# Patient Record
Sex: FEMALE | Race: OTHER | HISPANIC OR LATINO
[De-identification: names, ages, dates, MRNs, and addresses within clinical notes are randomized per-mention and may not be internally consistent; named-entity substitution may affect disease eponyms.]

---

## 2022-02-16 ENCOUNTER — APPOINTMENT (OUTPATIENT)
Dept: ANTEPARTUM | Facility: CLINIC | Age: 27
End: 2022-02-16
Payer: MEDICAID

## 2022-02-16 ENCOUNTER — ASOB RESULT (OUTPATIENT)
Age: 27
End: 2022-02-16

## 2022-02-16 PROBLEM — Z00.00 ENCOUNTER FOR PREVENTIVE HEALTH EXAMINATION: Status: ACTIVE | Noted: 2022-02-16

## 2022-02-16 PROCEDURE — 76811 OB US DETAILED SNGL FETUS: CPT

## 2022-02-24 ENCOUNTER — APPOINTMENT (OUTPATIENT)
Dept: MATERNAL FETAL MEDICINE | Facility: CLINIC | Age: 27
End: 2022-02-24
Payer: MEDICAID

## 2022-02-24 ENCOUNTER — ASOB RESULT (OUTPATIENT)
Age: 27
End: 2022-02-24

## 2022-02-24 PROCEDURE — 99442: CPT

## 2022-03-02 ENCOUNTER — APPOINTMENT (OUTPATIENT)
Dept: ANTEPARTUM | Facility: CLINIC | Age: 27
End: 2022-03-02

## 2022-03-02 ENCOUNTER — APPOINTMENT (OUTPATIENT)
Dept: MATERNAL FETAL MEDICINE | Facility: CLINIC | Age: 27
End: 2022-03-02

## 2022-03-04 ENCOUNTER — APPOINTMENT (OUTPATIENT)
Dept: ANTEPARTUM | Facility: CLINIC | Age: 27
End: 2022-03-04

## 2022-03-04 ENCOUNTER — APPOINTMENT (OUTPATIENT)
Dept: MATERNAL FETAL MEDICINE | Facility: CLINIC | Age: 27
End: 2022-03-04
Payer: MEDICAID

## 2022-03-04 VITALS
BODY MASS INDEX: 30.84 KG/M2 | WEIGHT: 153 LBS | RESPIRATION RATE: 16 BRPM | OXYGEN SATURATION: 99 % | SYSTOLIC BLOOD PRESSURE: 110 MMHG | HEART RATE: 69 BPM | DIASTOLIC BLOOD PRESSURE: 60 MMHG | HEIGHT: 59 IN

## 2022-03-04 DIAGNOSIS — Z3A.22 22 WEEKS GESTATION OF PREGNANCY: ICD-10-CM

## 2022-03-04 DIAGNOSIS — Z84.89 FAMILY HISTORY OF OTHER SPECIFIED CONDITIONS: ICD-10-CM

## 2022-03-04 DIAGNOSIS — O09.892 SUPERVISION OF OTHER HIGH RISK PREGNANCIES, SECOND TRIMESTER: ICD-10-CM

## 2022-03-04 DIAGNOSIS — Z64.1 PROBLEMS RELATED TO MULTIPARITY: ICD-10-CM

## 2022-03-04 DIAGNOSIS — O99.212 OBESITY COMPLICATING PREGNANCY, SECOND TRIMESTER: ICD-10-CM

## 2022-03-04 DIAGNOSIS — Z71.85 ENCOUNTER FOR IMMUNIZATION SAFETY COUNSELING: ICD-10-CM

## 2022-03-04 DIAGNOSIS — O34.219 MATERNAL CARE FOR UNSPECIFIED TYPE SCAR FROM PREVIOUS CESAREAN DELIVERY: ICD-10-CM

## 2022-03-04 PROCEDURE — 99215 OFFICE O/P EST HI 40 MIN: CPT

## 2022-03-04 RX ORDER — VITAMIN C, CALCIUM, IRON, VITAMIN D3, VITAMIN E, VITAMIN B1, VITAMIN B2, VITAMIN B3, VITAMIN B6, FOLIC ACID, IODINE, ZINC, COPPER, DOCUSATE SODIUM, DOCOSAHEXAENOIC ACID (DHA) 27-1-50 MG
KIT ORAL
Refills: 0 | Status: ACTIVE | COMMUNITY

## 2022-03-04 RX ORDER — ASPIRIN 81 MG
81 TABLET, DELAYED RELEASE (ENTERIC COATED) ORAL
Refills: 0 | Status: ACTIVE | COMMUNITY

## 2022-03-04 NOTE — VITALS
[LMP (date): ___] : LMP was on [unfilled] [GA =___ Weeks] : which calculates to a GA of [unfilled] weeks [GA= ___ Days] : and [unfilled] day(s) [JONH by LMP (date): ___] : The calculated JONH by LMP is [unfilled] [By LMP] : this is the final JONH

## 2022-03-04 NOTE — DISCUSSION/SUMMARY
[FreeTextEntry1] : She is 22 weeks gestation by her last menstrual period dates.   bpm.\par \par She is overweight and obesity has been associated with a number of maternal complications such as gestational diabetes, pre-eclampsia, thrombophlebitis, labor abnormalities, post-term pregnancies,  delivery, and operative complications. Obesity has been associated with adverse fetal outcomes such as late stillbirth and  deliveries.  Obese women also have a two to three-fold increased incidence in congenital anomalies. There were no major fetal malformations seen during the fetal anatomy ultrasound examination done on 22 \par \par She told me that her first pregnancy resulted in a 34 week  birth by  section due to extensive venereal warts.  She states that the obstetricians told her that it was a medically indicated  delivery because of the severity of the venereal warts.  Her second pregnancy was not complicated by venereal warts and resulted in a full term delivery. \par \par She had 2  sections and she was advised to have a repeat  section delivery.  I told her that multiple repeat  deliveries can result in serious maternal morbidity.  I told her that she is at risk for dense adhesions, more difficult surgery, placenta accreta, bowel injury, cystotomy, ureteral injury, ileus, hysterectomy, blood transfusions, intensive care unit admission, and  delivery.\par \par She is multiparous and we discussed the various methods available for contraception after delivery. She told me that she wants to have an operative sterilization procedure after the repeat  delivery. \par \par I told her that I recommend a glucola challenge test to screen for gestational diabetes between 24 and 28 weeks gestation.  I recommend having the COVID 19 vaccine during pregnancy if not already vaccinated. She told me that she received the fist dose of the Pfizer vaccine during 2021. She did not get the second dose. I encouraged her to get the second dose as soon as possible. I discussed the benefits and potential side effects associated with the COVID-19 vaccine.  She told me that she plans to get the second COVID-19 vaccine dose.  I told her that I recommend the Tdap vaccine between 27 and 36 weeks of gestation to prevent pertussis infection in the  infant. I recommended the flu vaccine during flu season (October through May). \par \par

## 2022-03-04 NOTE — CHIEF COMPLAINT
[G ___] : G [unfilled] [P ___] : P [unfilled] [de-identified] : history of a medically indicated  delivery in [facility]

## 2022-03-04 NOTE — OB HISTORY
[Pregnancy History] : patient received anesthesia [LMP: ___] : LMP: [unfilled] [JONH: ___] : JONH: [unfilled] [EGA: ___ wks] : EGA: [unfilled] wks [Spontaneous] : Spontaneous conception [Sonogram] : sonogram [at ___ wks] : at [unfilled] weeks [Definite:  ___ (Date)] : the last menstrual period was [unfilled] [Normal Amount/Duration] : was of a normal amount and duration [Regular Cycle Intervals] : periods have been regular [___] : Living: [unfilled] [FreeTextEntry1] : First prenatal visit was January 22, 2022.\par \par She had genetic counseling February 24, 2022. \par \par \par  [Spotting Between  Menses] : no spotting between menses

## 2022-03-04 NOTE — FAMILY HISTORY
[Age 35+ During Pregnancy] : not 35 or over during pregnancy [Reported Family History Of Birth Defects] : no congenital heart defects [Maninder-Sachs Carrier] : no Maninder-Sachs [Family History] : no mental retardation/autism [Reported Family History Of Genetic Disease] : no history of child defect in child of baby father

## 2022-04-20 ENCOUNTER — APPOINTMENT (OUTPATIENT)
Dept: ANTEPARTUM | Facility: CLINIC | Age: 27
End: 2022-04-20

## 2022-05-04 ENCOUNTER — APPOINTMENT (OUTPATIENT)
Dept: ANTEPARTUM | Facility: CLINIC | Age: 27
End: 2022-05-04
Payer: MEDICAID

## 2022-05-04 ENCOUNTER — ASOB RESULT (OUTPATIENT)
Age: 27
End: 2022-05-04

## 2022-05-04 PROCEDURE — 76816 OB US FOLLOW-UP PER FETUS: CPT

## 2022-06-13 ENCOUNTER — APPOINTMENT (OUTPATIENT)
Dept: ANTEPARTUM | Facility: CLINIC | Age: 27
End: 2022-06-13

## 2022-06-13 ENCOUNTER — ASOB RESULT (OUTPATIENT)
Age: 27
End: 2022-06-13

## 2022-06-13 PROCEDURE — 76819 FETAL BIOPHYS PROFIL W/O NST: CPT

## 2022-06-13 PROCEDURE — 76820 UMBILICAL ARTERY ECHO: CPT

## 2022-06-13 PROCEDURE — 76816 OB US FOLLOW-UP PER FETUS: CPT

## 2022-06-16 ENCOUNTER — TRANSCRIPTION ENCOUNTER (OUTPATIENT)
Age: 27
End: 2022-06-16

## 2022-06-17 ENCOUNTER — INPATIENT (INPATIENT)
Facility: HOSPITAL | Age: 27
LOS: 1 days | Discharge: HOME | End: 2022-06-19
Attending: OBSTETRICS & GYNECOLOGY | Admitting: OBSTETRICS & GYNECOLOGY
Payer: MEDICAID

## 2022-06-17 ENCOUNTER — TRANSCRIPTION ENCOUNTER (OUTPATIENT)
Age: 27
End: 2022-06-17

## 2022-06-17 ENCOUNTER — RESULT REVIEW (OUTPATIENT)
Age: 27
End: 2022-06-17

## 2022-06-17 VITALS
RESPIRATION RATE: 19 BRPM | SYSTOLIC BLOOD PRESSURE: 111 MMHG | DIASTOLIC BLOOD PRESSURE: 65 MMHG | HEART RATE: 68 BPM | TEMPERATURE: 99 F

## 2022-06-17 DIAGNOSIS — O26.893 OTHER SPECIFIED PREGNANCY RELATED CONDITIONS, THIRD TRIMESTER: ICD-10-CM

## 2022-06-17 DIAGNOSIS — O47.1 FALSE LABOR AT OR AFTER 37 COMPLETED WEEKS OF GESTATION: ICD-10-CM

## 2022-06-17 LAB
ABO RH CONFIRMATION: SIGNIFICANT CHANGE UP
BASOPHILS # BLD AUTO: 0.03 K/UL — SIGNIFICANT CHANGE UP (ref 0–0.2)
BASOPHILS NFR BLD AUTO: 0.3 % — SIGNIFICANT CHANGE UP (ref 0–2)
BLD GP AB SCN SERPL QL: SIGNIFICANT CHANGE UP
EOSINOPHIL # BLD AUTO: 0.06 K/UL — SIGNIFICANT CHANGE UP (ref 0–0.5)
EOSINOPHIL NFR BLD AUTO: 0.6 % — SIGNIFICANT CHANGE UP (ref 0–6)
HCT VFR BLD CALC: 34.1 % — LOW (ref 34.5–45)
HGB BLD-MCNC: 11.2 G/DL — LOW (ref 11.5–15.5)
HIV 1 & 2 AB SERPL IA.RAPID: SIGNIFICANT CHANGE UP
IMM GRANULOCYTES NFR BLD AUTO: 0.6 % — SIGNIFICANT CHANGE UP (ref 0–1.5)
LYMPHOCYTES # BLD AUTO: 2.8 K/UL — SIGNIFICANT CHANGE UP (ref 1–3.3)
LYMPHOCYTES # BLD AUTO: 26.7 % — SIGNIFICANT CHANGE UP (ref 13–44)
MCHC RBC-ENTMCNC: 28.4 PG — SIGNIFICANT CHANGE UP (ref 27–34)
MCHC RBC-ENTMCNC: 32.8 GM/DL — SIGNIFICANT CHANGE UP (ref 32–36)
MCV RBC AUTO: 86.3 FL — SIGNIFICANT CHANGE UP (ref 80–100)
MONOCYTES # BLD AUTO: 0.63 K/UL — SIGNIFICANT CHANGE UP (ref 0–0.9)
MONOCYTES NFR BLD AUTO: 6 % — SIGNIFICANT CHANGE UP (ref 2–14)
NEUTROPHILS # BLD AUTO: 6.9 K/UL — SIGNIFICANT CHANGE UP (ref 1.8–7.4)
NEUTROPHILS NFR BLD AUTO: 65.8 % — SIGNIFICANT CHANGE UP (ref 43–77)
PLATELET # BLD AUTO: 329 K/UL — SIGNIFICANT CHANGE UP (ref 150–400)
RAPID RVP RESULT: DETECTED
RBC # BLD: 3.95 M/UL — SIGNIFICANT CHANGE UP (ref 3.8–5.2)
RBC # FLD: 13.3 % — SIGNIFICANT CHANGE UP (ref 10.3–14.5)
RV+EV RNA SPEC QL NAA+PROBE: DETECTED
SARS-COV-2 RNA SPEC QL NAA+PROBE: SIGNIFICANT CHANGE UP
WBC # BLD: 10.48 K/UL — SIGNIFICANT CHANGE UP (ref 3.8–10.5)
WBC # FLD AUTO: 10.48 K/UL — SIGNIFICANT CHANGE UP (ref 3.8–10.5)

## 2022-06-17 PROCEDURE — 88307 TISSUE EXAM BY PATHOLOGIST: CPT | Mod: 26

## 2022-06-17 PROCEDURE — 88302 TISSUE EXAM BY PATHOLOGIST: CPT | Mod: 26

## 2022-06-17 RX ORDER — CITRIC ACID/SODIUM CITRATE 300-500 MG
30 SOLUTION, ORAL ORAL ONCE
Refills: 0 | Status: COMPLETED | OUTPATIENT
Start: 2022-06-17 | End: 2022-06-17

## 2022-06-17 RX ORDER — SODIUM CHLORIDE 9 MG/ML
1000 INJECTION, SOLUTION INTRAVENOUS
Refills: 0 | Status: DISCONTINUED | OUTPATIENT
Start: 2022-06-17 | End: 2022-06-17

## 2022-06-17 RX ORDER — OXYTOCIN 10 UNIT/ML
333.33 VIAL (ML) INJECTION
Qty: 20 | Refills: 0 | Status: DISCONTINUED | OUTPATIENT
Start: 2022-06-17 | End: 2022-06-17

## 2022-06-17 RX ORDER — SODIUM CHLORIDE 9 MG/ML
1000 INJECTION, SOLUTION INTRAVENOUS
Refills: 0 | Status: DISCONTINUED | OUTPATIENT
Start: 2022-06-17 | End: 2022-06-19

## 2022-06-17 RX ORDER — OXYCODONE HYDROCHLORIDE 5 MG/1
5 TABLET ORAL
Refills: 0 | Status: DISCONTINUED | OUTPATIENT
Start: 2022-06-17 | End: 2022-06-19

## 2022-06-17 RX ORDER — KETOROLAC TROMETHAMINE 30 MG/ML
30 SYRINGE (ML) INJECTION EVERY 6 HOURS
Refills: 0 | Status: DISCONTINUED | OUTPATIENT
Start: 2022-06-17 | End: 2022-06-18

## 2022-06-17 RX ORDER — ENOXAPARIN SODIUM 100 MG/ML
40 INJECTION SUBCUTANEOUS EVERY 24 HOURS
Refills: 0 | Status: DISCONTINUED | OUTPATIENT
Start: 2022-06-18 | End: 2022-06-19

## 2022-06-17 RX ORDER — CEFAZOLIN SODIUM 1 G
2000 VIAL (EA) INJECTION ONCE
Refills: 0 | Status: COMPLETED | OUTPATIENT
Start: 2022-06-17 | End: 2022-06-17

## 2022-06-17 RX ORDER — IBUPROFEN 200 MG
600 TABLET ORAL EVERY 6 HOURS
Refills: 0 | Status: COMPLETED | OUTPATIENT
Start: 2022-06-17 | End: 2023-05-16

## 2022-06-17 RX ORDER — DIPHENHYDRAMINE HCL 50 MG
25 CAPSULE ORAL EVERY 6 HOURS
Refills: 0 | Status: DISCONTINUED | OUTPATIENT
Start: 2022-06-17 | End: 2022-06-19

## 2022-06-17 RX ORDER — SIMETHICONE 80 MG/1
80 TABLET, CHEWABLE ORAL EVERY 4 HOURS
Refills: 0 | Status: DISCONTINUED | OUTPATIENT
Start: 2022-06-17 | End: 2022-06-19

## 2022-06-17 RX ORDER — MAGNESIUM HYDROXIDE 400 MG/1
30 TABLET, CHEWABLE ORAL
Refills: 0 | Status: DISCONTINUED | OUTPATIENT
Start: 2022-06-17 | End: 2022-06-19

## 2022-06-17 RX ORDER — TETANUS TOXOID, REDUCED DIPHTHERIA TOXOID AND ACELLULAR PERTUSSIS VACCINE, ADSORBED 5; 2.5; 8; 8; 2.5 [IU]/.5ML; [IU]/.5ML; UG/.5ML; UG/.5ML; UG/.5ML
0.5 SUSPENSION INTRAMUSCULAR ONCE
Refills: 0 | Status: DISCONTINUED | OUTPATIENT
Start: 2022-06-17 | End: 2022-06-19

## 2022-06-17 RX ORDER — OXYCODONE HYDROCHLORIDE 5 MG/1
5 TABLET ORAL ONCE
Refills: 0 | Status: DISCONTINUED | OUTPATIENT
Start: 2022-06-17 | End: 2022-06-19

## 2022-06-17 RX ORDER — OXYTOCIN 10 UNIT/ML
333.33 VIAL (ML) INJECTION
Qty: 20 | Refills: 0 | Status: DISCONTINUED | OUTPATIENT
Start: 2022-06-17 | End: 2022-06-19

## 2022-06-17 RX ORDER — LANOLIN
1 OINTMENT (GRAM) TOPICAL EVERY 6 HOURS
Refills: 0 | Status: DISCONTINUED | OUTPATIENT
Start: 2022-06-17 | End: 2022-06-19

## 2022-06-17 RX ORDER — HYDROMORPHONE HYDROCHLORIDE 2 MG/ML
0.5 INJECTION INTRAMUSCULAR; INTRAVENOUS; SUBCUTANEOUS ONCE
Refills: 0 | Status: DISCONTINUED | OUTPATIENT
Start: 2022-06-17 | End: 2022-06-17

## 2022-06-17 RX ORDER — ACETAMINOPHEN 500 MG
975 TABLET ORAL
Refills: 0 | Status: DISCONTINUED | OUTPATIENT
Start: 2022-06-17 | End: 2022-06-19

## 2022-06-17 RX ORDER — FAMOTIDINE 10 MG/ML
20 INJECTION INTRAVENOUS ONCE
Refills: 0 | Status: COMPLETED | OUTPATIENT
Start: 2022-06-17 | End: 2022-06-17

## 2022-06-17 RX ORDER — TRANEXAMIC ACID 100 MG/ML
1000 INJECTION, SOLUTION INTRAVENOUS ONCE
Refills: 0 | Status: COMPLETED | OUTPATIENT
Start: 2022-06-17 | End: 2022-06-17

## 2022-06-17 RX ORDER — SODIUM CHLORIDE 9 MG/ML
1000 INJECTION, SOLUTION INTRAVENOUS ONCE
Refills: 0 | Status: COMPLETED | OUTPATIENT
Start: 2022-06-17 | End: 2022-06-17

## 2022-06-17 RX ADMIN — Medication 30 MILLIGRAM(S): at 19:00

## 2022-06-17 RX ADMIN — Medication 975 MILLIGRAM(S): at 20:12

## 2022-06-17 RX ADMIN — SODIUM CHLORIDE 2000 MILLILITER(S): 9 INJECTION, SOLUTION INTRAVENOUS at 15:57

## 2022-06-17 RX ADMIN — Medication 100 MILLIGRAM(S): at 17:35

## 2022-06-17 RX ADMIN — FAMOTIDINE 20 MILLIGRAM(S): 10 INJECTION INTRAVENOUS at 16:40

## 2022-06-17 RX ADMIN — HYDROMORPHONE HYDROCHLORIDE 0.5 MILLIGRAM(S): 2 INJECTION INTRAMUSCULAR; INTRAVENOUS; SUBCUTANEOUS at 20:34

## 2022-06-17 RX ADMIN — TRANEXAMIC ACID 220 MILLIGRAM(S): 100 INJECTION, SOLUTION INTRAVENOUS at 17:19

## 2022-06-17 RX ADMIN — SODIUM CHLORIDE 125 MILLILITER(S): 9 INJECTION, SOLUTION INTRAVENOUS at 16:21

## 2022-06-17 RX ADMIN — Medication 30 MILLIGRAM(S): at 18:00

## 2022-06-17 RX ADMIN — Medication 30 MILLILITER(S): at 16:40

## 2022-06-17 NOTE — OB NEONATOLOGY/PEDIATRICIAN DELIVERY SUMMARY - NSPEDSNEONOTESA_OBGYN_ALL_OB_FT
Requested by DR Rogel to attend a RC/S of a 26y/o  at 37 weeks GA secondary to a NRFHRT and oligohydrmnios.......  She had + PNC, is blood type A pos, HIV neg, HBsAg neg, RPR NR, Rubella Imm, GBS Unk., COVID19 neg   L&D:  [facility] at delivery.  Baby born vertex. with good cry, transferred to warmer, orally suctioned, dried, and examined. Infant  then transferred to mother for STS.  A/P:  37 week GA female  Transition to Regular Nursery under Peds Hospitalist care. Requested by DR Rogel to attend a RC/S of a 28y/o  at 37 weeks GA secondary to a NRFHRT and oligohydrmnios.......  She had + PNC, is blood type A pos, HIV neg, HBsAg neg, RPR NR, Rubella Imm, GBS Unk., COVID19 neg   L&D:  [facility] at delivery.  Baby born vertex. with good cry, DCC,  transferred to warmer, orally suctioned, dried, and examined. Infant  then transferred to mother for STS.  BW: 2570g  A/P:  37 week GA female  Transition to Regular Nursery under Peds Hospitalist care.

## 2022-06-17 NOTE — OB RN PATIENT PROFILE - FALL HARM RISK - UNIVERSAL INTERVENTIONS
Bed in lowest position, wheels locked, appropriate side rails in place/Call bell, personal items and telephone in reach/Instruct patient to call for assistance before getting out of bed or chair/Non-slip footwear when patient is out of bed/[place] to call system/Physically safe environment - no spills, clutter or unnecessary equipment/Purposeful Proactive Rounding/Room/bathroom lighting operational, light cord in reach

## 2022-06-17 NOTE — OB PROVIDER DELIVERY SUMMARY - NS_TIMERUPTUREDADMITTED_OBGYN_ALL_OB_FT
0 Hour(s) 1 Minute(s) BMI:   HbA1c: A1C with Estimated Average Glucose Result: 5.4 % (04-27-21 @ 10:19)    Glucose:   BP: 133/81 (04-26-21 @ 15:58) (133/81 - 133/81)  Lipid Panel: Date/Time: 04-27-21 @ 10:19  Cholesterol, Serum: 176  Direct LDL: --  HDL Cholesterol, Serum: 42  Total Cholesterol/HDL Ration Measurement: --  Triglycerides, Serum: 95   BMI:   HbA1c: A1C with Estimated Average Glucose Result: 5.4 % (04-27-21 @ 10:19)    Glucose:   BP: 133/81 (04-26-21 @ 15:58) (126/71 - 134/84)  Lipid Panel: Date/Time: 04-27-21 @ 10:19  Cholesterol, Serum: 176  Direct LDL: --  HDL Cholesterol, Serum: 42  Total Cholesterol/HDL Ration Measurement: --  Triglycerides, Serum: 95

## 2022-06-17 NOTE — OB RN PATIENT PROFILE - THE METHOD OF FEEDING WHEN THE NEWBORN REQUESTS AND CONTINUING EACH FEEDING SESSION UNTIL THE NEWBORN IS SATISFIED
Health Maintenance Due   Topic Date Due   • Hepatitis B Vaccine (2 of 3 - 3-dose primary series) 2020       Patient is up to date, no discussion needed.         Statement Selected

## 2022-06-17 NOTE — OB RN INTRAOPERATIVE NOTE - NSSELHIDDEN_OBGYN_ALL_OB_FT
[NS_DeliveryAttending1_OBGYN_ALL_OB_FT:[id number]],[NS_DeliveryRN_OBGYN_ALL_OB_FT:[id number]=]

## 2022-06-17 NOTE — OB PROVIDER DELIVERY SUMMARY - NSPROVIDERDELIVERYNOTE_OBGYN_ALL_OB_FT
28yo  presenting for rCS+BS at 37w2d gestation secondary to abnormal fetal status.    Patient was taken to the OR, a repeat low transverse  section was performed and a viable female infant was delivered atraumatically in VTX presentation at 1807. Placenta delivered at 1808. Hysterotomy was closed. Dense omental adhesions to uterus and anterior abdominal wall were noted and taken down using the ligasure device. Bilateral salpingectomy was performed using the ligasure device. Rectus muscles, fascia, subcutaneous were reapproximated. Skin was closed with staples. Excellent hemostasis was obtained at each layer of closure.  Apgars 9/9.  EBL 703cc.  Weight 2570g.  IVF: 1700  UO: 400mL

## 2022-06-17 NOTE — OB PROVIDER DELIVERY SUMMARY - NSSELHIDDEN_OBGYN_ALL_OB_FT
[NS_DeliveryAttending1_OBGYN_ALL_OB_FT:[id number]],[NS_DeliveryRN_OBGYN_ALL_OB_FT:[id number]=]

## 2022-06-17 NOTE — DISCHARGE NOTE OB - HOSPITAL COURSE
s/p uncomplicated CS on 6/17. Patient transferred to post partum unit, uncomplicated hospital course. At the time of discharge patient was tolerating regular diet PO, ambulating, voiding, and demonstrating bowel function. Pain well controlled with pain medications PRN. s/p uncomplicated CS + BS on 6/17. Patient transferred to post partum unit, uncomplicated hospital course. At the time of discharge patient was tolerating regular diet PO, ambulating, voiding, and demonstrating bowel function. Pain well controlled with pain medications PRN.

## 2022-06-17 NOTE — OB RN DELIVERY SUMMARY - NS_SEPSISRSKCALC_OBGYN_ALL_OB_FT
EOS calculated successfully. EOS Risk Factor: 0.5/1000 live births ([facility] national incidence); GA=37w;Temp=98.6; ROM=0.017; GBS='Positive'; Antibiotics='No antibiotics or any antibiotics < 2 hrs prior to birth'

## 2022-06-17 NOTE — OB RN TRIAGE NOTE - FALL HARM RISK - UNIVERSAL INTERVENTIONS
Bed in lowest position, wheels locked, appropriate side rails in place/Call bell, personal items and telephone in reach/Instruct patient to call for assistance before getting out of bed or chair/Non-slip footwear when patient is out of bed/[place] to call system/Physically safe environment - no spills, clutter or unnecessary equipment/Purposeful Proactive Rounding/Room/bathroom lighting operational, light cord in reach

## 2022-06-17 NOTE — OB PROVIDER H&P - ATTENDING COMMENTS
27y  at 37 weeks   sent in for cat 2 tracing  bedside showed SUKH 1  pt for c sec with Masoud salpingectomy   explained the rsik of c section includes risk of infection, hemorrhage, need for blood transfusion,   injury to bowel or bladder, possible hysterectomy   pt verbalized understanding   accepts blood transfusion   plan   NPO   to OR  TXA pre op  DVT ppx

## 2022-06-17 NOTE — OB PROVIDER DELIVERY SUMMARY - AMNIOTIC FLUID COLOR, LABOR
Refilled per written protocol.     Hypertensive Medications  Protocol Criteria:  · Appointment scheduled in the past 6 months or in the next 3 months  · BMP or CMP in the past 12 months  · Creatinine result < 2  Recent Outpatient Visits            9 months
intact

## 2022-06-17 NOTE — DISCHARGE NOTE OB - CARE PROVIDER_API CALL
Mundo Fong)  Obstetrics and Gynecology  [address] [place] [address]  Phone: [phone]  Fax: [phone]  Follow Up Time: 1 week

## 2022-06-17 NOTE — DISCHARGE NOTE OB - CARE PLAN
Principal Discharge DX:	 delivery delivered  Assessment and plan of treatment:	1) Please take ibuprofen, tylenol and other prescribed medications as needed for pain.  2) Nothing in the vagina for 6 weeks (including no sex, no tampons, and no douching).  3) No tub baths or pools for 6 weeks. Showers are okay. Please keep your incision dry until your follow up appointment.  4) Please call your doctor for a follow up appointment  5) Please call the office sooner if you have heavy vaginal bleeding, severe abdominal pain, or fever over 100.4F.   1

## 2022-06-17 NOTE — DISCHARGE NOTE OB - MEDICATION SUMMARY - MEDICATIONS TO TAKE
I will START or STAY ON the medications listed below when I get home from the hospital:    ibuprofen 600 mg oral tablet  -- 1 tab(s) by mouth every 6 hours, As Needed -for mild pain   -- Do not take this drug if you are pregnant.  It is very important that you take or use this exactly as directed.  Do not skip doses or discontinue unless directed by your doctor.  May cause drowsiness or dizziness.  Obtain medical advice before taking any non-prescription drugs as some may affect the action of this medication.  Take with food or milk.    -- Indication: For pain    oxyCODONE 5 mg oral tablet  -- 1 tab(s) by mouth every 6 hours, As Needed -for severe pain MDD:4 tabs  -- Caution federal law prohibits the transfer of this drug to any person other  than the person for whom it was prescribed.  It is very important that you take or use this exactly as directed.  Do not skip doses or discontinue unless directed by your doctor.  May cause drowsiness or dizziness.  This prescription cannot be refilled.  Using more of this medication than prescribed may cause serious breathing problems.    -- Indication: For pain    Tylenol 325 mg oral tablet  -- 2 tab(s) by mouth every 6 hours, As Needed -for moderate pain   -- This product contains acetaminophen.  Do not use  with any other product containing acetaminophen to prevent possible liver damage.    -- Indication: For pain

## 2022-06-17 NOTE — DISCHARGE NOTE OB - PATIENT PORTAL LINK FT
You can access the FollowMyHealth Patient Portal offered by [facility] by registering at the following website: [email or web address]. By joining "[employer]’s FollowMyHealth portal, you will also be able to view your health information using other applications (apps) compatible with our system.

## 2022-06-17 NOTE — OB PROVIDER H&P - NSHPPHYSICALEXAM_GEN_ALL_CORE
T(C): 36.8 (06-17-22 @ 13:53), Max: 36.8 (06-17-22 @ 13:53)  HR: 68 (06-17-22 @ 14:17) (68 - 68)  BP: 107/64 (06-17-22 @ 14:17) (107/64 - 111/65)  RR: 17 (06-17-22 @ 13:53) (17 - 17)    Gen: NAD, well-appearing   Abd: Soft, gravid  Ext: non-tender, non-edematous  SVE: 0/0/-3  SSE: cervix visualized, no gross pooling, nitrazine equivocal, amnisure negative   Bedside sono: cephalic, anterior placenta, no 2x2 pocket visualized, fetal breathing and movement noted, good fetal tone   FHT: baseline 140s, moderate variability, +accels, -decels   [place]: occasional contractions

## 2022-06-17 NOTE — OB RN DELIVERY SUMMARY - NSSELHIDDEN_OBGYN_ALL_OB_FT
[NS_DeliveryAttending1_OBGYN_ALL_OB_FT:[id number]] [NS_DeliveryAttending1_OBGYN_ALL_OB_FT:[id number]],[NS_DeliveryRN_OBGYN_ALL_OB_FT:[id number]=]

## 2022-06-17 NOTE — DISCHARGE NOTE OB - MATERIALS PROVIDED
Vaccinations/[facility]  Screening Program/  Immunization Record/Breastfeeding Log/Breastfeeding Mother’s Support Group Information/Guide to Postpartum Care/[facility] Hearing Screen Program/Back To Sleep Handout/Shaken Baby Prevention Handout/Breastfeeding Guide and Packet/Birth Certificate Instructions/Discharge Medication Information for Patients and Families Pocket Guide/MMR Vaccination (VIS Pub Date: 2012)

## 2022-06-17 NOTE — OB PROVIDER H&P - ASSESSMENT
no
A/P: 27y  at 37 weeks GA who is being admitted for a rCS in the setting of oligohydramnios.   -Admit to L&D  -Consent  -Admission labs  -NPO since 0700  -IV fluids.   -Fetus: Cat I tracing. Continuous toco and fetal monitoring.   -Ancef 2g ordered  -DVT ppx: Ambulate and SCD's while in bed     Discussed with Dr. Rogel.

## 2022-06-17 NOTE — OB PROVIDER H&P - HISTORY OF PRESENT ILLNESS
27y  at 37 weeks GA sono who presents to L&D for non-reactive NST in the office. Patient denies vaginal bleeding, contractions and leakage of fluid. She endorses good fetal movement. Denies fevers, chills, nausea and vomiting. No other complaints at this time.   Prenatal course is significant for:  1. Poor fetal growth   2. H/o  delivery   3. Prior CS x2  4. 1hr GCT elevated, 3hr pending   5. GBS unknown     POB:  G1 , pCS at 34 weeks for "infection"   G2 2016, 2016. full term uncomplicated rCS  PGYN: -fibroids, -ovarian cysts, denies STD hx, denies abnormal PAPs   PMH: Denies  PSH: CS x2  SH: Denies EtOH, tobacco and illicit drug use during this pregnancy; feels safe at home   Meds: PNVs, aspirin   Allergies: NKDA    Sono: vertex, anterior placenta   EFW: 2512g on

## 2022-06-17 NOTE — DISCHARGE NOTE OB - NS MD DC FALL RISK RISK
For information on Fall & Injury Prevention, visit: [email or web address] OR  [email or web address] OR  https://www.cdc.gov/steadi/patient.html

## 2022-06-17 NOTE — DISCHARGE NOTE OB - PLAN OF CARE
1) Please take ibuprofen, tylenol and other prescribed medications as needed for pain.  2) Nothing in the vagina for 6 weeks (including no sex, no tampons, and no douching).  3) No tub baths or pools for 6 weeks. Showers are okay. Please keep your incision dry until your follow up appointment.  4) Please call your doctor for a follow up appointment  5) Please call the office sooner if you have heavy vaginal bleeding, severe abdominal pain, or fever over 100.4F.

## 2022-06-18 LAB
BASOPHILS # BLD AUTO: 0.02 K/UL — SIGNIFICANT CHANGE UP (ref 0–0.2)
BASOPHILS NFR BLD AUTO: 0.1 % — SIGNIFICANT CHANGE UP (ref 0–2)
EOSINOPHIL # BLD AUTO: 0 K/UL — SIGNIFICANT CHANGE UP (ref 0–0.5)
EOSINOPHIL NFR BLD AUTO: 0 % — SIGNIFICANT CHANGE UP (ref 0–6)
HCT VFR BLD CALC: 29.5 % — LOW (ref 34.5–45)
HGB BLD-MCNC: 9.3 G/DL — LOW (ref 11.5–15.5)
HIV 1+2 AB+HIV1 P24 AG SERPL QL IA: SIGNIFICANT CHANGE UP
IMM GRANULOCYTES NFR BLD AUTO: 0.5 % — SIGNIFICANT CHANGE UP (ref 0–1.5)
LYMPHOCYTES # BLD AUTO: 13.1 % — SIGNIFICANT CHANGE UP (ref 13–44)
LYMPHOCYTES # BLD AUTO: 2.1 K/UL — SIGNIFICANT CHANGE UP (ref 1–3.3)
MCHC RBC-ENTMCNC: 27.9 PG — SIGNIFICANT CHANGE UP (ref 27–34)
MCHC RBC-ENTMCNC: 31.5 GM/DL — LOW (ref 32–36)
MCV RBC AUTO: 88.6 FL — SIGNIFICANT CHANGE UP (ref 80–100)
MEV IGG SER-ACNC: 5 AU/ML — SIGNIFICANT CHANGE UP
MEV IGG+IGM SER-IMP: NEGATIVE — SIGNIFICANT CHANGE UP
MONOCYTES # BLD AUTO: 0.85 K/UL — SIGNIFICANT CHANGE UP (ref 0–0.9)
MONOCYTES NFR BLD AUTO: 5.3 % — SIGNIFICANT CHANGE UP (ref 2–14)
NEUTROPHILS # BLD AUTO: 12.94 K/UL — HIGH (ref 1.8–7.4)
NEUTROPHILS NFR BLD AUTO: 81 % — HIGH (ref 43–77)
PLATELET # BLD AUTO: 310 K/UL — SIGNIFICANT CHANGE UP (ref 150–400)
RBC # BLD: 3.33 M/UL — LOW (ref 3.8–5.2)
RBC # FLD: 13.2 % — SIGNIFICANT CHANGE UP (ref 10.3–14.5)
T PALLIDUM AB TITR SER: NEGATIVE — SIGNIFICANT CHANGE UP
WBC # BLD: 15.99 K/UL — HIGH (ref 3.8–10.5)
WBC # FLD AUTO: 15.99 K/UL — HIGH (ref 3.8–10.5)

## 2022-06-18 RX ORDER — ACETAMINOPHEN 500 MG
2 TABLET ORAL
Qty: 56 | Refills: 0
Start: 2022-06-18 | End: 2022-06-24

## 2022-06-18 RX ORDER — IBUPROFEN 200 MG
1 TABLET ORAL
Qty: 28 | Refills: 0
Start: 2022-06-18 | End: 2022-06-24

## 2022-06-18 RX ORDER — ASPIRIN/CALCIUM CARB/MAGNESIUM 324 MG
0 TABLET ORAL
Qty: 0 | Refills: 0 | DISCHARGE

## 2022-06-18 RX ORDER — OXYCODONE HYDROCHLORIDE 5 MG/1
1 TABLET ORAL
Qty: 8 | Refills: 0
Start: 2022-06-18 | End: 2022-06-19

## 2022-06-18 RX ORDER — IBUPROFEN 200 MG
600 TABLET ORAL EVERY 6 HOURS
Refills: 0 | Status: DISCONTINUED | OUTPATIENT
Start: 2022-06-18 | End: 2022-06-19

## 2022-06-18 RX ORDER — OXYCODONE HYDROCHLORIDE 5 MG/1
1 TABLET ORAL
Qty: 12 | Refills: 0
Start: 2022-06-18 | End: 2022-06-21

## 2022-06-18 RX ADMIN — Medication 30 MILLIGRAM(S): at 00:03

## 2022-06-18 RX ADMIN — SODIUM CHLORIDE 125 MILLILITER(S): 9 INJECTION, SOLUTION INTRAVENOUS at 08:39

## 2022-06-18 RX ADMIN — Medication 30 MILLIGRAM(S): at 06:15

## 2022-06-18 RX ADMIN — Medication 975 MILLIGRAM(S): at 08:39

## 2022-06-18 RX ADMIN — SIMETHICONE 80 MILLIGRAM(S): 80 TABLET, CHEWABLE ORAL at 20:59

## 2022-06-18 RX ADMIN — ENOXAPARIN SODIUM 40 MILLIGRAM(S): 100 INJECTION SUBCUTANEOUS at 08:40

## 2022-06-18 RX ADMIN — Medication 600 MILLIGRAM(S): at 18:07

## 2022-06-18 RX ADMIN — Medication 975 MILLIGRAM(S): at 15:42

## 2022-06-18 RX ADMIN — Medication 30 MILLIGRAM(S): at 13:11

## 2022-06-18 RX ADMIN — Medication 975 MILLIGRAM(S): at 03:17

## 2022-06-18 RX ADMIN — Medication 975 MILLIGRAM(S): at 20:59

## 2022-06-18 NOTE — PROGRESS NOTE ADULT - ASSESSMENT
A/P:  JEÚSS ALEXANDRA is a 27y  now POD#1 s/p repeat  section + BS @37w GA 2/2 oligo, NRFHT in office, uncomplicated.  -Vital signs stable  -Hgb: 11.2 -> AM labs pending   -[place] in place, TOV pending  -Tolerating PO  -Advance care as tolerated   -VTE ppx: encourage ambulation, SCDs while in bed, daily lovenox  -Continue routine postpartum and postoperative care and education  -Healthy female infant  -Dispo: Patient to be discharged when meeting all postpartum and postoperative milestones and pending attending approval.

## 2022-06-19 VITALS
OXYGEN SATURATION: 100 % | RESPIRATION RATE: 18 BRPM | TEMPERATURE: 98 F | SYSTOLIC BLOOD PRESSURE: 92 MMHG | DIASTOLIC BLOOD PRESSURE: 61 MMHG | HEART RATE: 66 BPM

## 2022-06-19 PROCEDURE — 86850 RBC ANTIBODY SCREEN: CPT

## 2022-06-19 PROCEDURE — 86901 BLOOD TYPING SEROLOGIC RH(D): CPT

## 2022-06-19 PROCEDURE — 59025 FETAL NON-STRESS TEST: CPT

## 2022-06-19 PROCEDURE — 86765 RUBEOLA ANTIBODY: CPT

## 2022-06-19 PROCEDURE — 85025 COMPLETE CBC W/AUTO DIFF WBC: CPT

## 2022-06-19 PROCEDURE — G0463: CPT

## 2022-06-19 PROCEDURE — 87389 HIV-1 AG W/HIV-1&-2 AB AG IA: CPT

## 2022-06-19 PROCEDURE — 59050 FETAL MONITOR W/REPORT: CPT

## 2022-06-19 PROCEDURE — 90707 MMR VACCINE SC: CPT

## 2022-06-19 PROCEDURE — 36415 COLL VENOUS BLD VENIPUNCTURE: CPT

## 2022-06-19 PROCEDURE — 83986 ASSAY PH BODY FLUID NOS: CPT

## 2022-06-19 PROCEDURE — 86703 HIV-1/HIV-2 1 RESULT ANTBDY: CPT

## 2022-06-19 PROCEDURE — 84112 EVAL AMNIOTIC FLUID PROTEIN: CPT

## 2022-06-19 PROCEDURE — 0225U NFCT DS DNA&RNA 21 SARSCOV2: CPT

## 2022-06-19 PROCEDURE — 86780 TREPONEMA PALLIDUM: CPT

## 2022-06-19 PROCEDURE — 88307 TISSUE EXAM BY PATHOLOGIST: CPT

## 2022-06-19 PROCEDURE — 88302 TISSUE EXAM BY PATHOLOGIST: CPT

## 2022-06-19 PROCEDURE — 86900 BLOOD TYPING SEROLOGIC ABO: CPT

## 2022-06-19 RX ADMIN — Medication 600 MILLIGRAM(S): at 00:00

## 2022-06-19 RX ADMIN — Medication 0.5 MILLILITER(S): at 10:35

## 2022-06-19 RX ADMIN — Medication 600 MILLIGRAM(S): at 06:17

## 2022-06-19 RX ADMIN — Medication 975 MILLIGRAM(S): at 03:10

## 2022-06-19 RX ADMIN — Medication 975 MILLIGRAM(S): at 09:33

## 2022-06-19 RX ADMIN — ENOXAPARIN SODIUM 40 MILLIGRAM(S): 100 INJECTION SUBCUTANEOUS at 09:32

## 2022-06-19 NOTE — PROGRESS NOTE ADULT - SUBJECTIVE AND OBJECTIVE BOX
JESÚS ALEXANDRA is a 27y  now POD#1 s/p repeat  section + BS @37w GA 2/2 oligo, NRFHT in office, uncomplicated.    S:    No acute events overnight.   The patient has no complaints.  Pain controlled with current treatment regimen.   She is ambulating without difficulty and tolerating PO.   - flatus/-BM/sanders in place  She endorses appropriate lochia, which is decreasing.   She is breastfeeding baby.  She denies fevers, chills, nausea and vomiting.   She denies lightheadedness, dizziness, palpitations, chest pain and SOB.     O:    T(C): 37.1 (22 @ 03:17), Max: 37.1 (22 @ 03:17)  HR: 57 (22 @ :17) (53 - 92)  BP: 97/59 (22 @ 03:17) (81/50 - 133/84)  RR: 17 (22 @ 03:17) (12 - 20)  SpO2: 98% (22 @ 03:17) (98% - 98%)    Gen: NAD, AOx3  Resp: breathing comfortably on RA  Abdomen:  Soft, appropriately tender  Incision: Clean/dry/intact with staples in place. pressure dressing removed. small area of bleeding, middle of incision. nontender, no hematoma/erythema. island dressing placed over.  Uterus:  Fundus firm below umbilicus  VE:  Lochia as expected                          11.2   10.48 )-----------( 329      ( 2022 15:30 )             34.1             
JESÚS ALEXANDRA is a 27y  now POD#2 s/p repeat  section + BS @37w GA 2/2 oligo, NRFHT in office, uncomplicated.    S:    No acute events overnight.   The patient has no complaints.  Pain controlled with current treatment regimen.   She is ambulating without difficulty and tolerating PO.   +flatus/-BM/+voiding  She endorses appropriate lochia, which is decreasing.   She is breastfeeding baby.  She denies fevers, chills, nausea and vomiting.   She denies lightheadedness, dizziness, palpitations, chest pain and SOB.     O:    Vital Signs Last 24 Hrs  T(C): 37.1 (2022 16:20), Max: 37.1 (2022 16:20)  T(F): 98.8 (2022 16:20), Max: 98.8 (2022 16:20)  HR: 59 (2022 16:20) (59 - 65)  BP: 95/53 (2022 16:20) (88/51 - 95/53)  RR: 18 (2022 16:20) (18 - 18)  SpO2: 100% (2022 16:20) (99% - 100%)    Gen: NAD, AOx3  Resp: breathing comfortably on RA  Abdomen:  Soft, appropriately tender  Incision: Clean/dry/intact with staples and island dressing placed over.  Uterus:  Fundus firm below umbilicus  VE:  Lochia as expected                                     9.3    15.99 )-----------( 310      ( 2022 06:03 )             29.5

## 2022-06-19 NOTE — PROGRESS NOTE ADULT - ASSESSMENT
A/P:  JESÚS ALEXANDRA is a 27y  now POD#2 s/p repeat  section + BS @37w GA 2/2 oligo, NRFHT in office, uncomplicated.  -Vital signs stable  -Hgb: 11.2 ->9.3  Spontaneously voiding  -Tolerating PO  -Advance care as tolerated   -VTE ppx: encourage ambulation, SCDs while in bed, daily lovenox  -Continue routine postpartum and postoperative care and education  -Healthy female infant  -Dispo: Anticipate discharge home today pending attending approval.

## 2022-06-19 NOTE — PROGRESS NOTE ADULT - ATTENDING COMMENTS
POD2  no complaints  breast feeding  abd soft, uterus firm, NT,wound intact  ext no edema  plan   DC home today   wound care discussed  PP precautions given  follow up in health center in 2 weeks

## 2022-06-20 ENCOUNTER — APPOINTMENT (OUTPATIENT)
Dept: ANTEPARTUM | Facility: CLINIC | Age: 27
End: 2022-06-20

## 2022-06-27 ENCOUNTER — APPOINTMENT (OUTPATIENT)
Dept: ANTEPARTUM | Facility: CLINIC | Age: 27
End: 2022-06-27

## 2022-07-01 LAB — SURGICAL PATHOLOGY STUDY: SIGNIFICANT CHANGE UP

## 2022-07-05 ENCOUNTER — APPOINTMENT (OUTPATIENT)
Dept: ANTEPARTUM | Facility: CLINIC | Age: 27
End: 2022-07-05

## 2025-04-10 NOTE — OB NEONATOLOGY/PEDIATRICIAN DELIVERY SUMMARY - BABY A: APGAR 1 MIN RESP RATE, DELIVERY
Attempted to call patient. No answer and no option to leave a VM (VM full). Will continue to try to reach him.    Thank you,    Ya Ponce, RN  Triage [facility]  04/10/25 08:41 EDT     (2) good, crying

## 2025-07-21 NOTE — OB RN DELIVERY SUMMARY - NSCSDELIVASCHE_OBGYN_ALL_OB
Hospitalist Focus Note    CKD stable, was 1.8 at start of her recent hospitalization now   Creatinine (mg/dL)   Date Value   07/18/2025 1.39 (H)     Vitals ok    Mild anemia no bleeding    Is on Eliquis, Lasix, atorvastatin, cozar, toprol, nifedipine.     DO JOSE GUADALUPE [place] Hospitalist  7/18/2025  Do not bill  
Increase nicardipine for better BP control  Eliquis 47 dollars  Creatinine (mg/dL)   Date Value   07/20/2025 1.35 (H)     Creatinine at baseline  Attempted to see pt but she was in therapy  Left voicemail for daughter Mark.  
Unscheduled